# Patient Record
Sex: MALE | Race: BLACK OR AFRICAN AMERICAN
[De-identification: names, ages, dates, MRNs, and addresses within clinical notes are randomized per-mention and may not be internally consistent; named-entity substitution may affect disease eponyms.]

---

## 2018-11-08 ENCOUNTER — HOSPITAL ENCOUNTER (INPATIENT)
Dept: HOSPITAL 74 - YASAS | Age: 53
LOS: 28 days | Discharge: HOME | DRG: 772 | End: 2018-12-06
Attending: PSYCHIATRY & NEUROLOGY | Admitting: PSYCHIATRY & NEUROLOGY
Payer: COMMERCIAL

## 2018-11-08 VITALS — BODY MASS INDEX: 36.2 KG/M2

## 2018-11-08 DIAGNOSIS — F10.20: Primary | ICD-10-CM

## 2018-11-08 DIAGNOSIS — E78.00: ICD-10-CM

## 2018-11-08 DIAGNOSIS — E05.90: ICD-10-CM

## 2018-11-08 DIAGNOSIS — I10: ICD-10-CM

## 2018-11-08 DIAGNOSIS — F14.20: ICD-10-CM

## 2018-11-08 DIAGNOSIS — J30.9: ICD-10-CM

## 2018-11-08 LAB
APPEARANCE UR: CLEAR
BILIRUB UR STRIP.AUTO-MCNC: NEGATIVE MG/DL
COLOR UR: YELLOW
EPITH CASTS URNS QL MICRO: (no result) /HPF
KETONES UR QL STRIP: NEGATIVE
LEUKOCYTE ESTERASE UR QL STRIP.AUTO: NEGATIVE
MUCOUS THREADS URNS QL MICRO: (no result)
NITRITE UR QL STRIP: NEGATIVE
PH UR: 5 [PH] (ref 5–8)
PROT UR QL STRIP: (no result)
PROT UR QL STRIP: NEGATIVE
SP GR UR: 1.02 (ref 1.01–1.03)
UROBILINOGEN UR STRIP-MCNC: 2 MG/DL (ref 0.2–1)

## 2018-11-08 PROCEDURE — HZ42ZZZ GROUP COUNSELING FOR SUBSTANCE ABUSE TREATMENT, COGNITIVE-BEHAVIORAL: ICD-10-PCS | Performed by: PSYCHIATRY & NEUROLOGY

## 2018-11-08 RX ADMIN — FLUTICASONE PROPIONATE SCH SPRAY: 50 SPRAY, METERED NASAL at 21:19

## 2018-11-08 RX ADMIN — LISINOPRIL SCH MG: 20 TABLET ORAL at 19:11

## 2018-11-08 RX ADMIN — ATORVASTATIN CALCIUM SCH MG: 20 TABLET, FILM COATED ORAL at 21:20

## 2018-11-08 RX ADMIN — Medication PRN MG: at 21:21

## 2018-11-08 RX ADMIN — Medication SCH MG: at 21:20

## 2018-11-08 NOTE — HP
CIWA Score





- Admission Criteria


[facility] Guidelines: Admission for Medically Managed Detox: 


Requires at least one of the followin. CIWA greater than 12


2. Seizures within the past 24 hours


3. Delirium tremens within the past 24 hours


4. Hallucinations within the past 24 hours


5. Acute intervention needed for co  occurring medical disorder


6. Acute intervention needed for co  occurring psychiatric disorder


7. Severe withdrawal that cannot be handled at a lower level of care (continued


    vomiting, continued diarrhea, abnormal vital signs) requiring intravenous


    medication and/or fluids


8. Pregnancy








Admission ROS [facility]





- HPI


Chief Complaint: 





" I need rehab"


alcohol and crack /cocaine rehabilitation 


Allergies/Adverse Reactions: 


 Allergies











Allergy/AdvReac Type Severity Reaction Status Date / Time


 


No Known Allergies Allergy   Verified 18 16:05











History of Present Illness: 





54 yo male undomiciled, crack / cocaine and alcohol dependence is here seeking 

rehab. Last detox [facility] . 


Longest period of sobriety two months, relapse this past September. PMHX: HTN, 

Hyperthyroid, HDL (non-adherent with meds). Denies suicidal / homicidal 

ideation or suicide attempt. Denies hx of seizures or blackouts. 











Exam Limitations: No Limitations





- Ebola screening


Have you traveled outside of the country in the last 21 days: No


Have you had contact with anyone from an Ebola affected area: No


Have you been sick,other than usual withdrawal symptoms: No





- Review of Systems


Constitutional: Changes in sleep


EENT: reports: Nose Congestion


Respiratory: reports: No Symptoms reported


Cardiac: reports: No Symptoms Reported


GI: reports: No Symptoms Reported


: reports: No Symptoms Reported


Musculoskeletal: reports: Back Pain


Integumentary: reports: Dryness


Neuro: reports: No Symptoms reported


Endocrine: reports: No Symptoms Reported


Hematology: reports: No Symptoms Reported


Psychiatric: reports: Orientated x3, Anxious


Other Systems: Reviewed and Negative





Patient History





- Patient Medical History


Hx Anemia: No


Hx Asthma: No


Hx Chronic Obstructive Pulmonary Disease (COPD): No


Hx Cancer: No


Hx Cardiac Disorders: No


Hx Congestive Heart Failure: No


Hx Hypertension: Yes


Hx Hypercholesterolemia: Yes (on meds, lipitor)


Hx Pacemaker: No


HX Cerebrovascular Accident: No


Hx Seizures: No


Hx Dementia: No


Hx Diabetes: No


Hx Gastrointestinal Disorders: No


Hx Liver Disease: No


Hx Genitourinary Disorders: No


Hx Sexually Transmitted Disorders: No


Hx Renal Disease (ESRD): No


Hx Thyroid Disease: Yes (hyperthyroid, methimazole)


Hx Human Immunodeficiency Virus (HIV): No (Pt reports last neg results 18)


Hx Hepatitis C: No


Hx Depression: No


Hx Suicide Attempt: No


Hx Bipolar Disorder: No


Hx Schizophrenia: No





- Patient Surgical History


Past Surgical History: Yes


Hx Neurologic Surgery: No


Hx Cataract Extraction: No


Hx Cardiac Surgery: No


Hx Lung Surgery: No


Hx Breast Surgery: No


Hx Breast Biopsy: No


Hx Abdominal Surgery: No


Hx Appendectomy: No


Hx Cholecystectomy: No


Hx Genitourinary Surgery: No


Hx  Section: No


Hx Orthopedic Surgery: No


Other Surgical History: umbilical hernia repair as chlld


Anesthesia Reaction: No





- PPD History


Previous Implant?: Yes (Patient reports neg PPD hx )


Documented Results: Negative w/o proof


Date: 14


Results: 0 mm


PPD to be Administered?: Yes





- Smoking Cessation


Smoking history: Never smoked


Have you smoked in the past 12 months: No


Hx Chewing Tobacco Use: No


Initiated information on smoking cessation: No





- Substance & Tx. History


Hx Alcohol Use: Yes


Hx Substance Use: Yes


Substance Use Type: Alcohol, Cocaine


Hx Substance Use Treatment: Yes (Last detox [facility] )





- Substances Abused


  ** Crack


Route: Smoking


Frequency: 1-2 times per week


Amount used: $30


Age of first use: 24


Date of Last Use: 18





  ** Alcohol-beer


Route: Oral


Frequency: Daily


Amount used: 2 x 6 pks.


Age of first use: 17


Date of Last Use: 10/15/18





Family Disease History





- Family Disease History


Family Disease History: Diabetes: Sister (alcohol), Heart Disease: Mother (

alcohol), Other: Mother, Sister





Admission Physical Exam [facility]





- Vital Signs


Vital Signs: 


 Vital Signs - 24 hr











  18





  15:15


 


Temperature 97.6 F


 


Pulse Rate 70


 


Respiratory 18





Rate 


 


Blood Pressure 149/102 H














- Physical


General Appearance: Yes: Disheveled, Obese, Anxious


HEENTM: Yes: EOMI, Hearing grossly Normal, Normal ENT Inspection, Normocephalic

, Normal Voice, KIRAN, Pharynx Normal, Tm's normal, Nasal Congestion, Rhinorrhea


Respiratory: Yes: Chest Non-Tender, Lungs Clear, Normal Breath Sounds, No 

Respiratory Distress, No Accessory Muscle Use


Neck: Yes: Within Normal Limits


Breast: Yes: Breast Exam Deferred


Cardiology: Yes: Regular Rhythm, Regular Rate


Abdominal: Yes: Normal Bowel Sounds, Non Tender


Genitourinary: Yes: Within Normal Limits


Back: Yes: Normal Inspection


Musculoskeletal: Yes: full range of Motion, Gait Steady, Pelvis Stable, Back 

pain


Extremities: Yes: Normal Capillary Refill, Normal Inspection, Normal Range of 

Motion, Non-Tender


Neurological: Yes: CNs II-XII NML intact, Fully Oriented, Alert, Motor Strength 

5/5, Normal Mood/Affect, Normal Response, Depressed Affect


Integumentary: Yes: Normal Color, Dry, Warm


Lymphatic: Yes: Within Normal Limits





- Diagnostic


(1) Alcohol dependence


Current Visit: Yes   Status: Acute   


Qualifiers: 


   Substance use status: uncomplicated   Qualified Code(s): F10.20 - Alcohol 

dependence, uncomplicated   





(2) Allergic rhinitis


Current Visit: Yes   Status: Acute   


Qualifiers: 


   Allergic rhinitis trigger: unspecified   Allergic rhinitis seasonality: 

unspecified   Qualified Code(s): J30.9 - Allergic rhinitis, unspecified   





(3) Essential hypertension


Current Visit: Yes   Status: Chronic   





(4) Hypercholesterolemia


Current Visit: Yes   Status: Chronic   





(5) Hyperthyroidism


Current Visit: Yes   Status: Chronic   





(6) Cocaine dependence


Current Visit: Yes   Status: Acute   


Qualifiers: 


   Substance use status: uncomplicated   Qualified Code(s): F14.20 - Cocaine 

dependence, uncomplicated   





[facility] Breath Alcohol Content


Breath Alcohol Content: 0





Urine Drug Screen





- Results


Drug Screen Negative: Yes





Inpatient Rehab Admission





- Initial Determination


Are CD services needed?: Yes


Free of communicable disease: Yes


Not in need of hospitalization: Yes





- Rehab Admission Criteria


Previous failed treatment: Yes


Poor recovery environment: Yes


Comorbidities: Yes


Lacks judgement: Yes


Patient is meeting Inpatient Rehab admission criteria:: Yes

## 2018-11-09 LAB
ALBUMIN SERPL-MCNC: 3.4 G/DL (ref 3.4–5)
ALP SERPL-CCNC: 67 U/L (ref 45–117)
ALT SERPL-CCNC: 39 U/L (ref 13–61)
ANION GAP SERPL CALC-SCNC: 6 MMOL/L (ref 8–16)
AST SERPL-CCNC: 24 U/L (ref 15–37)
BILIRUB SERPL-MCNC: 0.6 MG/DL (ref 0.2–1)
BUN SERPL-MCNC: 13 MG/DL (ref 7–18)
CALCIUM SERPL-MCNC: 8.6 MG/DL (ref 8.5–10.1)
CHLORIDE SERPL-SCNC: 108 MMOL/L (ref 98–107)
CO2 SERPL-SCNC: 28 MMOL/L (ref 22–28)
CREAT SERPL-MCNC: 1.2 MG/DL (ref 0.55–1.3)
DEPRECATED RDW RBC AUTO: 13.1 % (ref 11.9–15.9)
GLUCOSE SERPL-MCNC: 104 MG/DL (ref 74–106)
HCT VFR BLD CALC: 46.5 % (ref 35.4–49)
HGB BLD-MCNC: 15.7 GM/DL (ref 11.7–16.9)
MCH RBC QN AUTO: 30.6 PG (ref 25.7–33.7)
MCHC RBC AUTO-ENTMCNC: 33.8 G/DL (ref 32–35.9)
MCV RBC: 90.5 FL (ref 80–96)
PLATELET # BLD AUTO: 196 K/MM3 (ref 134–434)
PMV BLD: 8.9 FL (ref 7.5–11.1)
POTASSIUM SERPLBLD-SCNC: 4.4 MMOL/L (ref 3.5–5.1)
PROT SERPL-MCNC: 6.4 G/DL (ref 6.4–8.2)
RBC # BLD AUTO: 5.14 M/MM3 (ref 4–5.6)
SODIUM SERPL-SCNC: 142 MMOL/L (ref 136–145)
WBC # BLD AUTO: 4.3 K/MM3 (ref 4–10)

## 2018-11-09 RX ADMIN — METHIMAZOLE SCH MG: 5 TABLET ORAL at 09:57

## 2018-11-09 RX ADMIN — Medication SCH TAB: at 09:57

## 2018-11-09 RX ADMIN — Medication SCH MG: at 21:17

## 2018-11-09 RX ADMIN — ATORVASTATIN CALCIUM SCH MG: 20 TABLET, FILM COATED ORAL at 21:17

## 2018-11-09 RX ADMIN — Medication PRN MG: at 21:18

## 2018-11-09 RX ADMIN — LISINOPRIL SCH MG: 20 TABLET ORAL at 09:57

## 2018-11-09 RX ADMIN — FLUTICASONE PROPIONATE SCH SPRAY: 50 SPRAY, METERED NASAL at 09:57

## 2018-11-09 RX ADMIN — FLUTICASONE PROPIONATE SCH SPRAY: 50 SPRAY, METERED NASAL at 21:17

## 2018-11-10 RX ADMIN — Medication PRN MG: at 21:20

## 2018-11-10 RX ADMIN — Medication SCH TAB: at 09:58

## 2018-11-10 RX ADMIN — FLUTICASONE PROPIONATE SCH SPRAY: 50 SPRAY, METERED NASAL at 09:58

## 2018-11-10 RX ADMIN — METHIMAZOLE SCH MG: 5 TABLET ORAL at 09:59

## 2018-11-10 RX ADMIN — FLUTICASONE PROPIONATE SCH: 50 SPRAY, METERED NASAL at 21:20

## 2018-11-10 RX ADMIN — LISINOPRIL SCH MG: 20 TABLET ORAL at 09:59

## 2018-11-10 RX ADMIN — ATORVASTATIN CALCIUM SCH MG: 20 TABLET, FILM COATED ORAL at 21:20

## 2018-11-10 RX ADMIN — Medication SCH MG: at 21:20

## 2018-11-11 RX ADMIN — Medication PRN MG: at 21:17

## 2018-11-11 RX ADMIN — Medication SCH MG: at 21:17

## 2018-11-11 RX ADMIN — FLUTICASONE PROPIONATE SCH: 50 SPRAY, METERED NASAL at 21:53

## 2018-11-11 RX ADMIN — METHIMAZOLE SCH MG: 5 TABLET ORAL at 09:36

## 2018-11-11 RX ADMIN — ATORVASTATIN CALCIUM SCH MG: 20 TABLET, FILM COATED ORAL at 21:17

## 2018-11-11 RX ADMIN — FLUTICASONE PROPIONATE SCH: 50 SPRAY, METERED NASAL at 09:36

## 2018-11-11 RX ADMIN — LISINOPRIL SCH MG: 20 TABLET ORAL at 09:36

## 2018-11-11 RX ADMIN — Medication SCH TAB: at 09:36

## 2018-11-12 RX ADMIN — ATORVASTATIN CALCIUM SCH MG: 20 TABLET, FILM COATED ORAL at 21:10

## 2018-11-12 RX ADMIN — LISINOPRIL SCH MG: 20 TABLET ORAL at 10:16

## 2018-11-12 RX ADMIN — FLUTICASONE PROPIONATE SCH: 50 SPRAY, METERED NASAL at 21:11

## 2018-11-12 RX ADMIN — Medication SCH MG: at 21:10

## 2018-11-12 RX ADMIN — Medication PRN MG: at 21:11

## 2018-11-12 RX ADMIN — Medication SCH TAB: at 10:16

## 2018-11-12 RX ADMIN — METHIMAZOLE SCH MG: 5 TABLET ORAL at 10:16

## 2018-11-12 RX ADMIN — FLUTICASONE PROPIONATE SCH: 50 SPRAY, METERED NASAL at 10:16

## 2018-11-13 RX ADMIN — Medication PRN MG: at 21:09

## 2018-11-13 RX ADMIN — METHIMAZOLE SCH MG: 5 TABLET ORAL at 10:07

## 2018-11-13 RX ADMIN — ATORVASTATIN CALCIUM SCH MG: 20 TABLET, FILM COATED ORAL at 21:09

## 2018-11-13 RX ADMIN — LISINOPRIL SCH MG: 20 TABLET ORAL at 10:07

## 2018-11-13 RX ADMIN — FLUTICASONE PROPIONATE SCH: 50 SPRAY, METERED NASAL at 21:16

## 2018-11-13 RX ADMIN — Medication SCH MG: at 21:09

## 2018-11-13 RX ADMIN — FLUTICASONE PROPIONATE SCH: 50 SPRAY, METERED NASAL at 10:08

## 2018-11-13 RX ADMIN — Medication SCH TAB: at 10:07

## 2018-11-14 RX ADMIN — FLUTICASONE PROPIONATE SCH: 50 SPRAY, METERED NASAL at 09:49

## 2018-11-14 RX ADMIN — Medication SCH TAB: at 09:48

## 2018-11-14 RX ADMIN — FLUTICASONE PROPIONATE SCH: 50 SPRAY, METERED NASAL at 21:21

## 2018-11-14 RX ADMIN — METHIMAZOLE SCH MG: 5 TABLET ORAL at 09:48

## 2018-11-14 RX ADMIN — ATORVASTATIN CALCIUM SCH MG: 20 TABLET, FILM COATED ORAL at 21:20

## 2018-11-14 RX ADMIN — LISINOPRIL SCH MG: 20 TABLET ORAL at 09:48

## 2018-11-14 RX ADMIN — Medication PRN MG: at 21:20

## 2018-11-14 RX ADMIN — Medication SCH MG: at 21:20

## 2018-11-15 RX ADMIN — FLUTICASONE PROPIONATE SCH: 50 SPRAY, METERED NASAL at 21:23

## 2018-11-15 RX ADMIN — Medication SCH MG: at 21:22

## 2018-11-15 RX ADMIN — FLUTICASONE PROPIONATE SCH: 50 SPRAY, METERED NASAL at 10:26

## 2018-11-15 RX ADMIN — Medication SCH TAB: at 10:25

## 2018-11-15 RX ADMIN — Medication PRN MG: at 21:23

## 2018-11-15 RX ADMIN — ATORVASTATIN CALCIUM SCH MG: 20 TABLET, FILM COATED ORAL at 21:22

## 2018-11-15 RX ADMIN — METHIMAZOLE SCH MG: 5 TABLET ORAL at 10:25

## 2018-11-15 RX ADMIN — LISINOPRIL SCH MG: 20 TABLET ORAL at 10:26

## 2018-11-16 RX ADMIN — ATORVASTATIN CALCIUM SCH MG: 20 TABLET, FILM COATED ORAL at 21:23

## 2018-11-16 RX ADMIN — LISINOPRIL SCH MG: 20 TABLET ORAL at 10:00

## 2018-11-16 RX ADMIN — FLUTICASONE PROPIONATE SCH: 50 SPRAY, METERED NASAL at 21:23

## 2018-11-16 RX ADMIN — Medication SCH TAB: at 10:00

## 2018-11-16 RX ADMIN — Medication SCH MG: at 21:23

## 2018-11-16 RX ADMIN — FLUTICASONE PROPIONATE SCH: 50 SPRAY, METERED NASAL at 10:00

## 2018-11-16 RX ADMIN — METHIMAZOLE SCH MG: 5 TABLET ORAL at 10:00

## 2018-11-16 RX ADMIN — Medication PRN MG: at 21:23

## 2018-11-17 RX ADMIN — METHIMAZOLE SCH MG: 5 TABLET ORAL at 09:40

## 2018-11-17 RX ADMIN — Medication SCH TAB: at 09:40

## 2018-11-17 RX ADMIN — Medication PRN MG: at 21:17

## 2018-11-17 RX ADMIN — LISINOPRIL SCH MG: 20 TABLET ORAL at 09:40

## 2018-11-17 RX ADMIN — FLUTICASONE PROPIONATE SCH: 50 SPRAY, METERED NASAL at 09:40

## 2018-11-17 RX ADMIN — Medication SCH MG: at 21:16

## 2018-11-17 RX ADMIN — ATORVASTATIN CALCIUM SCH MG: 20 TABLET, FILM COATED ORAL at 21:16

## 2018-11-17 RX ADMIN — FLUTICASONE PROPIONATE SCH: 50 SPRAY, METERED NASAL at 21:17

## 2018-11-18 RX ADMIN — METHIMAZOLE SCH MG: 5 TABLET ORAL at 09:42

## 2018-11-18 RX ADMIN — Medication PRN MG: at 21:18

## 2018-11-18 RX ADMIN — LISINOPRIL SCH MG: 20 TABLET ORAL at 09:42

## 2018-11-18 RX ADMIN — ATORVASTATIN CALCIUM SCH MG: 20 TABLET, FILM COATED ORAL at 21:18

## 2018-11-18 RX ADMIN — FLUTICASONE PROPIONATE SCH: 50 SPRAY, METERED NASAL at 21:19

## 2018-11-18 RX ADMIN — Medication SCH MG: at 21:18

## 2018-11-18 RX ADMIN — Medication SCH TAB: at 09:42

## 2018-11-18 RX ADMIN — FLUTICASONE PROPIONATE SCH: 50 SPRAY, METERED NASAL at 09:42

## 2018-11-19 RX ADMIN — Medication PRN MG: at 21:25

## 2018-11-19 RX ADMIN — FLUTICASONE PROPIONATE SCH: 50 SPRAY, METERED NASAL at 21:25

## 2018-11-19 RX ADMIN — ATORVASTATIN CALCIUM SCH MG: 20 TABLET, FILM COATED ORAL at 21:25

## 2018-11-19 RX ADMIN — LISINOPRIL SCH MG: 20 TABLET ORAL at 10:43

## 2018-11-19 RX ADMIN — METHIMAZOLE SCH MG: 5 TABLET ORAL at 10:48

## 2018-11-19 RX ADMIN — FLUTICASONE PROPIONATE SCH: 50 SPRAY, METERED NASAL at 10:48

## 2018-11-19 RX ADMIN — Medication SCH TAB: at 10:43

## 2018-11-19 RX ADMIN — Medication SCH MG: at 21:51

## 2018-11-20 RX ADMIN — LISINOPRIL SCH MG: 20 TABLET ORAL at 10:37

## 2018-11-20 RX ADMIN — Medication SCH TAB: at 10:37

## 2018-11-20 RX ADMIN — ATORVASTATIN CALCIUM SCH MG: 20 TABLET, FILM COATED ORAL at 21:30

## 2018-11-20 RX ADMIN — Medication PRN MG: at 21:30

## 2018-11-20 RX ADMIN — Medication SCH MG: at 21:30

## 2018-11-20 RX ADMIN — FLUTICASONE PROPIONATE SCH: 50 SPRAY, METERED NASAL at 23:02

## 2018-11-20 RX ADMIN — METHIMAZOLE SCH MG: 5 TABLET ORAL at 10:37

## 2018-11-20 RX ADMIN — FLUTICASONE PROPIONATE SCH: 50 SPRAY, METERED NASAL at 10:37

## 2018-11-21 RX ADMIN — ATORVASTATIN CALCIUM SCH MG: 20 TABLET, FILM COATED ORAL at 21:24

## 2018-11-21 RX ADMIN — LISINOPRIL SCH MG: 20 TABLET ORAL at 10:07

## 2018-11-21 RX ADMIN — Medication SCH TAB: at 10:07

## 2018-11-21 RX ADMIN — METHIMAZOLE SCH MG: 5 TABLET ORAL at 10:07

## 2018-11-21 RX ADMIN — Medication PRN MG: at 21:24

## 2018-11-21 RX ADMIN — FLUTICASONE PROPIONATE SCH: 50 SPRAY, METERED NASAL at 21:25

## 2018-11-21 RX ADMIN — Medication SCH MG: at 21:24

## 2018-11-21 RX ADMIN — FLUTICASONE PROPIONATE SCH: 50 SPRAY, METERED NASAL at 10:08

## 2018-11-22 RX ADMIN — FLUTICASONE PROPIONATE SCH: 50 SPRAY, METERED NASAL at 09:57

## 2018-11-22 RX ADMIN — Medication SCH TAB: at 09:57

## 2018-11-22 RX ADMIN — LISINOPRIL SCH MG: 20 TABLET ORAL at 09:57

## 2018-11-22 RX ADMIN — METHIMAZOLE SCH MG: 5 TABLET ORAL at 09:57

## 2018-11-22 RX ADMIN — Medication PRN MG: at 21:28

## 2018-11-22 RX ADMIN — Medication SCH MG: at 21:27

## 2018-11-22 RX ADMIN — FLUTICASONE PROPIONATE SCH: 50 SPRAY, METERED NASAL at 21:28

## 2018-11-22 RX ADMIN — ATORVASTATIN CALCIUM SCH MG: 20 TABLET, FILM COATED ORAL at 21:27

## 2018-11-23 RX ADMIN — Medication SCH TAB: at 09:52

## 2018-11-23 RX ADMIN — Medication PRN MG: at 21:34

## 2018-11-23 RX ADMIN — FLUTICASONE PROPIONATE SCH: 50 SPRAY, METERED NASAL at 21:34

## 2018-11-23 RX ADMIN — FLUTICASONE PROPIONATE SCH: 50 SPRAY, METERED NASAL at 09:53

## 2018-11-23 RX ADMIN — Medication SCH MG: at 21:33

## 2018-11-23 RX ADMIN — METHIMAZOLE SCH MG: 5 TABLET ORAL at 09:53

## 2018-11-23 RX ADMIN — LISINOPRIL SCH MG: 20 TABLET ORAL at 09:52

## 2018-11-23 RX ADMIN — ATORVASTATIN CALCIUM SCH MG: 20 TABLET, FILM COATED ORAL at 21:33

## 2018-11-24 RX ADMIN — Medication SCH MG: at 21:22

## 2018-11-24 RX ADMIN — METHIMAZOLE SCH MG: 5 TABLET ORAL at 10:00

## 2018-11-24 RX ADMIN — ATORVASTATIN CALCIUM SCH MG: 20 TABLET, FILM COATED ORAL at 21:23

## 2018-11-24 RX ADMIN — LISINOPRIL SCH MG: 20 TABLET ORAL at 10:01

## 2018-11-24 RX ADMIN — Medication SCH TAB: at 10:00

## 2018-11-24 RX ADMIN — FLUTICASONE PROPIONATE SCH: 50 SPRAY, METERED NASAL at 21:23

## 2018-11-24 RX ADMIN — FLUTICASONE PROPIONATE SCH: 50 SPRAY, METERED NASAL at 10:01

## 2018-11-24 RX ADMIN — Medication PRN MG: at 21:23

## 2018-11-25 RX ADMIN — LISINOPRIL SCH MG: 20 TABLET ORAL at 09:50

## 2018-11-25 RX ADMIN — Medication SCH TAB: at 09:50

## 2018-11-25 RX ADMIN — Medication SCH MG: at 21:59

## 2018-11-25 RX ADMIN — Medication PRN MG: at 21:59

## 2018-11-25 RX ADMIN — ATORVASTATIN CALCIUM SCH MG: 20 TABLET, FILM COATED ORAL at 21:59

## 2018-11-25 RX ADMIN — METHIMAZOLE SCH MG: 5 TABLET ORAL at 09:50

## 2018-11-25 RX ADMIN — FLUTICASONE PROPIONATE SCH: 50 SPRAY, METERED NASAL at 09:50

## 2018-11-25 RX ADMIN — FLUTICASONE PROPIONATE SCH: 50 SPRAY, METERED NASAL at 21:59

## 2018-11-26 RX ADMIN — FLUTICASONE PROPIONATE SCH: 50 SPRAY, METERED NASAL at 21:39

## 2018-11-26 RX ADMIN — METHIMAZOLE SCH MG: 5 TABLET ORAL at 10:25

## 2018-11-26 RX ADMIN — FLUTICASONE PROPIONATE SCH: 50 SPRAY, METERED NASAL at 10:24

## 2018-11-26 RX ADMIN — LISINOPRIL SCH MG: 20 TABLET ORAL at 10:24

## 2018-11-26 RX ADMIN — Medication SCH TAB: at 10:24

## 2018-11-26 RX ADMIN — Medication PRN MG: at 21:25

## 2018-11-26 RX ADMIN — ATORVASTATIN CALCIUM SCH MG: 20 TABLET, FILM COATED ORAL at 21:24

## 2018-11-26 RX ADMIN — Medication SCH MG: at 21:24

## 2018-11-27 RX ADMIN — Medication SCH MG: at 21:40

## 2018-11-27 RX ADMIN — Medication PRN MG: at 21:40

## 2018-11-27 RX ADMIN — LISINOPRIL SCH MG: 20 TABLET ORAL at 10:28

## 2018-11-27 RX ADMIN — METHIMAZOLE SCH MG: 5 TABLET ORAL at 10:28

## 2018-11-27 RX ADMIN — Medication SCH TAB: at 10:28

## 2018-11-27 RX ADMIN — FLUTICASONE PROPIONATE SCH: 50 SPRAY, METERED NASAL at 21:41

## 2018-11-27 RX ADMIN — ATORVASTATIN CALCIUM SCH MG: 20 TABLET, FILM COATED ORAL at 21:41

## 2018-11-27 RX ADMIN — FLUTICASONE PROPIONATE SCH: 50 SPRAY, METERED NASAL at 10:28

## 2018-11-28 RX ADMIN — FLUTICASONE PROPIONATE SCH: 50 SPRAY, METERED NASAL at 22:16

## 2018-11-28 RX ADMIN — ATORVASTATIN CALCIUM SCH MG: 20 TABLET, FILM COATED ORAL at 21:35

## 2018-11-28 RX ADMIN — METHIMAZOLE SCH MG: 5 TABLET ORAL at 10:05

## 2018-11-28 RX ADMIN — Medication SCH TAB: at 10:04

## 2018-11-28 RX ADMIN — FLUTICASONE PROPIONATE SCH: 50 SPRAY, METERED NASAL at 10:05

## 2018-11-28 RX ADMIN — LISINOPRIL SCH MG: 20 TABLET ORAL at 10:05

## 2018-11-28 RX ADMIN — Medication SCH MG: at 21:35

## 2018-11-28 RX ADMIN — Medication PRN MG: at 21:35

## 2018-11-29 RX ADMIN — METHIMAZOLE SCH MG: 5 TABLET ORAL at 10:36

## 2018-11-29 RX ADMIN — ATORVASTATIN CALCIUM SCH MG: 20 TABLET, FILM COATED ORAL at 21:34

## 2018-11-29 RX ADMIN — Medication SCH TAB: at 10:36

## 2018-11-29 RX ADMIN — Medication SCH MG: at 21:34

## 2018-11-29 RX ADMIN — FLUTICASONE PROPIONATE SCH: 50 SPRAY, METERED NASAL at 10:36

## 2018-11-29 RX ADMIN — FLUTICASONE PROPIONATE SCH: 50 SPRAY, METERED NASAL at 21:35

## 2018-11-29 RX ADMIN — Medication PRN MG: at 21:34

## 2018-11-29 RX ADMIN — LISINOPRIL SCH MG: 20 TABLET ORAL at 10:36

## 2018-11-30 RX ADMIN — LISINOPRIL SCH MG: 20 TABLET ORAL at 10:06

## 2018-11-30 RX ADMIN — FLUTICASONE PROPIONATE SCH: 50 SPRAY, METERED NASAL at 21:36

## 2018-11-30 RX ADMIN — Medication SCH TAB: at 10:06

## 2018-11-30 RX ADMIN — Medication PRN MG: at 21:37

## 2018-11-30 RX ADMIN — ATORVASTATIN CALCIUM SCH MG: 20 TABLET, FILM COATED ORAL at 21:36

## 2018-11-30 RX ADMIN — FLUTICASONE PROPIONATE SCH: 50 SPRAY, METERED NASAL at 12:09

## 2018-11-30 RX ADMIN — METHIMAZOLE SCH MG: 5 TABLET ORAL at 10:07

## 2018-11-30 RX ADMIN — Medication SCH MG: at 21:36

## 2018-12-01 RX ADMIN — Medication SCH TAB: at 09:51

## 2018-12-01 RX ADMIN — METHIMAZOLE SCH MG: 5 TABLET ORAL at 09:51

## 2018-12-01 RX ADMIN — Medication SCH MG: at 21:29

## 2018-12-01 RX ADMIN — LISINOPRIL SCH MG: 20 TABLET ORAL at 09:51

## 2018-12-01 RX ADMIN — FLUTICASONE PROPIONATE SCH: 50 SPRAY, METERED NASAL at 09:51

## 2018-12-01 RX ADMIN — ATORVASTATIN CALCIUM SCH MG: 20 TABLET, FILM COATED ORAL at 21:29

## 2018-12-01 RX ADMIN — Medication PRN MG: at 21:29

## 2018-12-01 RX ADMIN — FLUTICASONE PROPIONATE SCH: 50 SPRAY, METERED NASAL at 21:29

## 2018-12-02 RX ADMIN — Medication SCH TAB: at 09:54

## 2018-12-02 RX ADMIN — FLUTICASONE PROPIONATE SCH: 50 SPRAY, METERED NASAL at 09:55

## 2018-12-02 RX ADMIN — LISINOPRIL SCH MG: 20 TABLET ORAL at 09:54

## 2018-12-02 RX ADMIN — METHIMAZOLE SCH MG: 5 TABLET ORAL at 09:54

## 2018-12-02 RX ADMIN — FLUTICASONE PROPIONATE SCH: 50 SPRAY, METERED NASAL at 21:31

## 2018-12-02 RX ADMIN — Medication PRN MG: at 21:31

## 2018-12-02 RX ADMIN — Medication SCH MG: at 21:31

## 2018-12-02 RX ADMIN — ATORVASTATIN CALCIUM SCH MG: 20 TABLET, FILM COATED ORAL at 21:31

## 2018-12-03 RX ADMIN — Medication PRN MG: at 21:24

## 2018-12-03 RX ADMIN — FLUTICASONE PROPIONATE SCH: 50 SPRAY, METERED NASAL at 21:25

## 2018-12-03 RX ADMIN — ATORVASTATIN CALCIUM SCH MG: 20 TABLET, FILM COATED ORAL at 21:24

## 2018-12-03 RX ADMIN — Medication SCH MG: at 21:24

## 2018-12-03 RX ADMIN — LISINOPRIL SCH MG: 20 TABLET ORAL at 10:44

## 2018-12-03 RX ADMIN — Medication SCH TAB: at 10:43

## 2018-12-03 RX ADMIN — FLUTICASONE PROPIONATE SCH: 50 SPRAY, METERED NASAL at 10:43

## 2018-12-03 RX ADMIN — METHIMAZOLE SCH MG: 5 TABLET ORAL at 10:43

## 2018-12-04 RX ADMIN — Medication PRN MG: at 21:34

## 2018-12-04 RX ADMIN — FLUTICASONE PROPIONATE SCH: 50 SPRAY, METERED NASAL at 21:34

## 2018-12-04 RX ADMIN — LISINOPRIL SCH MG: 20 TABLET ORAL at 10:31

## 2018-12-04 RX ADMIN — ATORVASTATIN CALCIUM SCH MG: 20 TABLET, FILM COATED ORAL at 21:34

## 2018-12-04 RX ADMIN — FLUTICASONE PROPIONATE SCH: 50 SPRAY, METERED NASAL at 10:31

## 2018-12-04 RX ADMIN — METHIMAZOLE SCH MG: 5 TABLET ORAL at 10:31

## 2018-12-04 RX ADMIN — Medication SCH TAB: at 10:30

## 2018-12-04 RX ADMIN — Medication SCH MG: at 21:34

## 2018-12-05 RX ADMIN — METHIMAZOLE SCH MG: 5 TABLET ORAL at 10:36

## 2018-12-05 RX ADMIN — Medication PRN MG: at 21:19

## 2018-12-05 RX ADMIN — FLUTICASONE PROPIONATE SCH: 50 SPRAY, METERED NASAL at 10:37

## 2018-12-05 RX ADMIN — FLUTICASONE PROPIONATE SCH: 50 SPRAY, METERED NASAL at 21:20

## 2018-12-05 RX ADMIN — Medication SCH TAB: at 10:36

## 2018-12-05 RX ADMIN — Medication SCH MG: at 21:19

## 2018-12-05 RX ADMIN — ATORVASTATIN CALCIUM SCH MG: 20 TABLET, FILM COATED ORAL at 21:19

## 2018-12-05 RX ADMIN — LISINOPRIL SCH MG: 20 TABLET ORAL at 10:36

## 2018-12-05 NOTE — PN
Psychiatric Progress Note


Vital Signs: 


 Vital Signs











 Period  Temp  Pulse  Resp  BP Sys/Dooley  Pulse Ox


 


 Last 24 Hr  98.6 F  75-90  16-18  142-142/78-82  











Date of Session: 12/05/18


Chief Complaint:: Discharge visit (coverage) : 


HPI: Case of a 53 AA male admitted to [address] to address alcohol + 

cocaine dependence. Completed rehabilitation program. Uneventful hospital 

course. Scheduled for discharge on 12/6/18.


ROS: Unremarkable. Patient is ambulatory, visible on the unit. Sociable. 

Cognitively intact.


Current Medications: 


Active Medications











Generic Name Dose Route Start Last Admin





  Trade Name Freq  PRN Reason Stop Dose Admin


 


Acetaminophen  650 mg  11/08/18 18:32  





  Tylenol -  PO   





  Q4H PRN   





  FEVER   





     





     





     


 


Al Hydroxide/Mg Hydroxide  30 ml  11/08/18 18:32  





  Mylanta Oral Suspension -  PO   





  Q6H PRN   





  DYSPEPSIA   





     





     





     


 


Atorvastatin Calcium  20 mg  11/08/18 22:00  12/04/18 21:34





  Lipitor -  PO   20 mg





  HS CHAD   Administration





     





     





     





     


 


Eucalyptus/Menthol/Phenol/Sorbitol  1 each  11/08/18 18:32  





  Cepastat Lozenge -  MM   





  Q4H PRN   





  SORE THROAT   





     





     





     


 


Fluticasone Propionate  1 spray  11/08/18 22:00  12/05/18 10:37





  Flonase -  NS   Not Given





  BID CHAD   





     





     





     





     


 


Guaifenesin  10 ml  11/08/18 18:32  





  Robitussin Dm -  PO   





  Q6H PRN   





  COUGH   





     





     





     


 


Hydroxyzine Pamoate  25 mg  11/08/18 18:36  





  Vistaril -  PO   





  Q4H PRN   





  FOR ITCHING   





     





     





     


 


Ibuprofen  400 mg  11/08/18 18:32  





  Motrin -  PO   





  Q6H PRN   





  Pain level 4-6   





     





     





     


 


Lisinopril  20 mg  11/08/18 17:45  12/05/18 10:36





  Prinivil  PO   20 mg





  DAILY CHAD   Administration





     





     





     





     


 


Loperamide HCl  4 mg  11/08/18 18:32  





  Imodium -  PO   





  Q6H PRN   





  DIARRHEA   





     





     





     


 


Magnesium Citrate  300 ml  11/08/18 18:32  





  Citroma -  PO   





  Q48H PRN   





  CONSTIPATION   





     





     





     


 


Magnesium Hydroxide  30 ml  11/08/18 18:32  





  Milk Of Magnesia -  PO   





  DAILY PRN   





  CONSTIPATION   





     





     





     


 


Melatonin  5 mg  11/08/18 22:00  12/04/18 21:34





  Melatonin  PO   5 mg





  HS PRN   Administration





  INSOMNIA   





     





     





     


 


Methimazole  5 mg  11/09/18 10:00  12/05/18 10:36





  Tapazole -  PO   5 mg





  DAILY CHAD   Administration





     





     





     





     


 


Prenatal Multivit/Folic Acid/Iron  1 tab  11/09/18 10:00  12/05/18 10:36





  Prenatal Vitamins (Sjr) -  PO   1 tab





  DAILY CHAD   Administration





     





     





     





     


 


Pseudoephedrine/Triprolidine  1 combo  11/08/18 18:32  11/08/18 21:21





  Actifed -  PO   1 combo





  TID PRN   Administration





  NASAL CONGESTION   





     





     





     


 


Thiamine HCl  100 mg  11/08/18 22:00  12/04/18 21:34





  Vitamin B1 -  PO   100 mg





  HS CHAD   Administration





     





     





     





     











Medication(s) Change(s): None. Patient is not on psychotropic medications. No 

scripts necessary.


Current Side Effect: No


Lab tests ordered: No


Lab tests reviewed: Yes


Provider note:: Chart reviewed. Met with the patient for assessment of mental 

status. Mr Johnson will complete this program on 12/6/19. Treatment goals are met. 

Patient will continue to address his issues (cocaine + alcohol dependence) in 

OPD setting at the [facility] in [facility] ([phone]). No acute 

medical issues. Normal vitals. No somatic complaints. Patient endorses linear 

sleep, euthymia, adequate energy level and motivation for the preservation of 

[facility]. Feels ready to meet the challenges 

awaiting him in the community. Patient states that he will do his best to avoid 

triggers (situations/associations) that predispose to relapses and jeopardize 

wellness. Plans to keep his medical appointments. Agrees to honor his referral 

to the [facility] program (appointment set for 12/10/18 at 8 am). Patient is 

medically stable. Mental status is consistent with relaxed mood, calm demeanor, 

adequate impulse control and intact cognition. No suicidal/homicidal ideation, 

intent or plan. No evidence of a thought disorder. Mr Johnson is at his baseline. 

He is ready and safe for discharge. Discussed with Multidisciplinary team.


Total face to face time:: 35





Mental Status Exam





- Mental Status Exam


Alert and Oriented to: Time, Place, Person


Cognitive Function: Good


Patient Appearance: Well Groomed


Mood: Hopeful, Euthymic


Affect: Appropriate, Normal Range


Patient Behavior: Appropriate, Cooperative


Speech Pattern: Clear, Appropriate


Voice Loudness: Normal


Thought Process: Intact, Goal Oriented


Thought Disorder: Not Present


Hallucinations: Denies


Suicidal Ideation: Denies


Homicidal Ideation: Denies


Insight/Judgement: Fair


Sleep: Well


Appetite: Good


Muscle strength/Tone: Normal


Gait/Station: Normal





Psychiatric Treatment Plan





- Problem List


(1) Alcohol dependence


Current Visit: Yes   


Qualifiers: 


   Substance use status: uncomplicated   Qualified Code(s): F10.20 - Alcohol 

dependence, uncomplicated   


Comment: .   





(2) Cocaine dependence


Current Visit: Yes   


Qualifiers: 


   Substance use status: uncomplicated   Qualified Code(s): F14.20 - Cocaine 

dependence, uncomplicated   


Comment: .

## 2018-12-06 VITALS — DIASTOLIC BLOOD PRESSURE: 83 MMHG | HEART RATE: 78 BPM | SYSTOLIC BLOOD PRESSURE: 123 MMHG | TEMPERATURE: 98.2 F

## 2018-12-06 RX ADMIN — METHIMAZOLE SCH MG: 5 TABLET ORAL at 09:17

## 2018-12-06 RX ADMIN — FLUTICASONE PROPIONATE SCH: 50 SPRAY, METERED NASAL at 12:19

## 2018-12-06 RX ADMIN — Medication SCH TAB: at 09:17

## 2018-12-06 RX ADMIN — LISINOPRIL SCH MG: 20 TABLET ORAL at 09:17
